# Patient Record
Sex: FEMALE | HISPANIC OR LATINO | Employment: FULL TIME | URBAN - METROPOLITAN AREA
[De-identification: names, ages, dates, MRNs, and addresses within clinical notes are randomized per-mention and may not be internally consistent; named-entity substitution may affect disease eponyms.]

---

## 2017-07-31 ENCOUNTER — TELEPHONE (OUTPATIENT)
Dept: ENDOCRINOLOGY | Facility: CLINIC | Age: 47
End: 2017-07-31

## 2017-07-31 NOTE — TELEPHONE ENCOUNTER
Spoke with patient. Got her schedule to see Dr. Winters this Friday. I gave her the time and location. Asked her to bring her insurance card and id.

## 2017-07-31 NOTE — TELEPHONE ENCOUNTER
----- Message from Luci Norman sent at 7/31/2017 11:46 AM CDT -----  Contact: Self  /  [phone]  Sundar  -  Oniel calling to speak with the Dr in regards to having an ultrasound done   Thank,

## 2017-08-04 ENCOUNTER — OFFICE VISIT (OUTPATIENT)
Dept: ENDOCRINOLOGY | Facility: CLINIC | Age: 47
End: 2017-08-04
Payer: COMMERCIAL

## 2017-08-04 ENCOUNTER — TELEPHONE (OUTPATIENT)
Dept: ENDOCRINOLOGY | Facility: CLINIC | Age: 47
End: 2017-08-04

## 2017-08-04 VITALS
HEIGHT: 63 IN | RESPIRATION RATE: 16 BRPM | BODY MASS INDEX: 30.28 KG/M2 | WEIGHT: 170.88 LBS | HEART RATE: 69 BPM | SYSTOLIC BLOOD PRESSURE: 116 MMHG | DIASTOLIC BLOOD PRESSURE: 72 MMHG

## 2017-08-04 DIAGNOSIS — R73.03 PREDIABETES: ICD-10-CM

## 2017-08-04 DIAGNOSIS — E04.1 THYROID NODULE: Primary | ICD-10-CM

## 2017-08-04 DIAGNOSIS — E03.8 SUBCLINICAL HYPOTHYROIDISM: ICD-10-CM

## 2017-08-04 DIAGNOSIS — E55.9 VITAMIN D DEFICIENCY: ICD-10-CM

## 2017-08-04 PROCEDURE — 3008F BODY MASS INDEX DOCD: CPT | Mod: S$GLB,,, | Performed by: INTERNAL MEDICINE

## 2017-08-04 PROCEDURE — 99999 PR PBB SHADOW E&M-EST. PATIENT-LVL III: CPT | Mod: PBBFAC,,, | Performed by: INTERNAL MEDICINE

## 2017-08-04 PROCEDURE — 99204 OFFICE O/P NEW MOD 45 MIN: CPT | Mod: S$GLB,,, | Performed by: INTERNAL MEDICINE

## 2017-08-04 NOTE — ASSESSMENT & PLAN NOTE
Replacing with calcium supplement per PCP.  It sounds like the fracture occurred in the setting of significant trauma.  Can consider

## 2017-08-04 NOTE — ASSESSMENT & PLAN NOTE
A1c 6.0.  Encouraged diet and exercise.  Per documentation we received, PCP plans to recheck in 3 months.

## 2017-08-04 NOTE — PROGRESS NOTES
Subjective:      Chief Complaint: Thyroid Nodule      HPI: Tricia Dias is a 46 y.o. female who is here for an initial evaluation for thyroid nodule and abnormal TFTs    With regards to the thyroid nodule:    Patient had a recent U/S done after complaints of occasional sore throat at her PCP's office. It was performed at [facility] in [place]. It showed 2 solid nodules in the right lower pole. The larger is 1.7 cm, well-circumscribed, hypoechoic with moderate intralesional hypervascularity. The smaller lesion was not measured in the dictation, but was described as ill-defined borders, slightly complex with some tiny cystic areas. On the left, she had a few subcentimeter nodules without suspicious features.    We do not have the disc with the actual images available today.    TSH is 4.28 with FT4 of 0.85    No difficulty breathing swallowing   No voice changes  No FH of thyroid cancer  No personal history of radiation treatment or exposure     No signs or symptoms of hyper or hypothyroidism     Patient reports some weight gain. Denies other complaints.  No bowel changes  No heat or cold intolerance   No hair nail or skin changes  No cp, palpations or sob      With regards to vitamin D deficiency:  She is on vitamin D replacement per PCP.  Last 25-OH Vit D was 24  She had a tib-fib fracture 7 years ago, which has healed well. Denies any history of other fractures.  She takes Vitamin D and calcium supplementation.      With regards to subclinical hypothyroidism:  Recent TSH is 4.28 with FT4 of 0.85  Only hypo Sx is weight gain. Denies other complaints.      With regards to prediabetes:  Patient reports recent weight gain.  Encouraged diet and exercise to prevent transition to overt diabetes.      Reviewed past medical, family, social history and updated as appropriate.    Review of Systems   Constitutional: Positive for unexpected weight change (mild weight gain).   Eyes: Negative for visual disturbance.   Respiratory:  Negative for shortness of breath.    Cardiovascular: Negative for chest pain.   Gastrointestinal: Negative for abdominal pain.        No dysphagia.   Genitourinary: Negative for urgency.   Musculoskeletal: Negative for arthralgias.   Skin: Negative for wound.   Neurological: Negative for headaches.   Hematological: Does not bruise/bleed easily.   Psychiatric/Behavioral: Negative for sleep disturbance.     Objective:     Vitals:    08/04/17 1031   BP: 116/72   Pulse: 69   Resp: 16       Physical Exam   Constitutional: She appears well-developed.   HENT:   Right Ear: External ear normal.   Left Ear: External ear normal.   Nose: Nose normal.   Hearing grossly normal  Dentition grossly normal   Neck: No tracheal deviation present. Thyromegaly (Mildly enlarged. Could not palpate nodules on exam today.) present.   Cardiovascular: Normal rate.    No murmur heard.  Pulmonary/Chest: Effort normal and breath sounds normal.   Abdominal: Soft. She exhibits no mass. There is no tenderness.   Musculoskeletal: She exhibits no edema.   Gait Normal  No digital clubbing or extremity cyanosis   Neurological: No cranial nerve deficit. Coordination normal.   Skin: No rash noted.   No subcutaneous nodules noted.   Psychiatric: She has a normal mood and affect.   Alert and oriented to person, place, and situation.   Nursing note and vitals reviewed.    Reviewed outside labs and thyroid U/S.    Assessment/Plan:     Thyroid nodule  Hypoechoic 1.7 cm nodule meets criteria for FNA.    I have reviewed management options including observation, FNA or surgery.  All of the patients questions were answered.     Discussed indications for a FNA, and patient would like to proceed.    Will proceed with FNA        Subclinical hypothyroidism  No indication to treat at this point.   Will send for repeat TSH and TPO Ab today.    Vitamin D deficiency  Replacing with calcium supplement per PCP.  It sounds like the fracture occurred in the setting of  significant trauma.  Can consider    Prediabetes  A1c 6.0.  Encouraged diet and exercise.  Per documentation we received, PCP plans to recheck in 3 months.      RTC in 1 year (will schedule sooner if biopsy abnormal)    I discussed the case with Dr. Monahan and she is in agreement with the plan.

## 2017-08-04 NOTE — ASSESSMENT & PLAN NOTE
Hypoechoic 1.7 cm nodule meets criteria for FNA.    I have reviewed management options including observation, FNA or surgery.  All of the patients questions were answered.     Discussed indications for a FNA, and patient would like to proceed.    Will proceed with FNA

## 2017-08-04 NOTE — TELEPHONE ENCOUNTER
I spoke to patient about her normal TSH and TPO antibodies. I reminded her about her appointment for biopsy on 8/21. She had no questions.

## 2017-08-21 ENCOUNTER — TELEPHONE (OUTPATIENT)
Dept: ENDOCRINOLOGY | Facility: CLINIC | Age: 47
End: 2017-08-21

## 2017-08-21 ENCOUNTER — HOSPITAL ENCOUNTER (OUTPATIENT)
Dept: ENDOCRINOLOGY | Facility: CLINIC | Age: 47
Discharge: HOME | End: 2017-08-21
Attending: INTERNAL MEDICINE
Payer: COMMERCIAL

## 2017-08-21 DIAGNOSIS — E04.1 THYROID NODULE: ICD-10-CM

## 2017-08-21 PROCEDURE — 88173 CYTOPATH EVAL FNA REPORT: CPT | Performed by: PATHOLOGY

## 2017-08-21 PROCEDURE — 88173 CYTOPATH EVAL FNA REPORT: CPT | Mod: 26,,, | Performed by: PATHOLOGY

## 2017-08-21 PROCEDURE — 10022 US FINE NEEDLE ASPIRATION WITH IMAGING: CPT | Mod: S$GLB,,, | Performed by: INTERNAL MEDICINE

## 2017-08-21 PROCEDURE — 76942 ECHO GUIDE FOR BIOPSY: CPT | Mod: S$GLB,,, | Performed by: INTERNAL MEDICINE

## 2017-08-21 NOTE — TELEPHONE ENCOUNTER
----- Message from Isabel Edward MA sent at 8/21/2017  7:24 AM CDT -----  Regarding: Ultrasound report  Good morning,  Can you please locate the outside ultrasound report for this patient who Dr. Winters with Dr. Monahan on 8/4. Because I do not have a report this patient may need to be called and rescheduled and told we need another copy of the report.   Thank you!  Isabel

## 2017-08-24 ENCOUNTER — TELEPHONE (OUTPATIENT)
Dept: ENDOCRINOLOGY | Facility: CLINIC | Age: 47
End: 2017-08-24

## 2017-08-24 NOTE — TELEPHONE ENCOUNTER
I spoke to MsKenji Arun today regarding the results of her thyroid biopsy, which came back as benign.

## 2017-08-27 ENCOUNTER — TELEPHONE (OUTPATIENT)
Dept: ENDOCRINOLOGY | Facility: CLINIC | Age: 47
End: 2017-08-27